# Patient Record
Sex: MALE | Race: ASIAN | URBAN - METROPOLITAN AREA
[De-identification: names, ages, dates, MRNs, and addresses within clinical notes are randomized per-mention and may not be internally consistent; named-entity substitution may affect disease eponyms.]

---

## 2018-09-27 ENCOUNTER — MEDICAL CORRESPONDENCE (OUTPATIENT)
Dept: HEALTH INFORMATION MANAGEMENT | Facility: CLINIC | Age: 78
End: 2018-09-27

## 2018-10-22 ENCOUNTER — RADIANT APPOINTMENT (OUTPATIENT)
Dept: CARDIOLOGY | Facility: CLINIC | Age: 78
End: 2018-10-22
Attending: INTERNAL MEDICINE
Payer: COMMERCIAL

## 2018-10-22 DIAGNOSIS — R01.1 HEART MURMUR: ICD-10-CM

## 2018-10-24 ENCOUNTER — OFFICE VISIT (OUTPATIENT)
Dept: OPHTHALMOLOGY | Facility: CLINIC | Age: 78
End: 2018-10-24
Attending: OPHTHALMOLOGY
Payer: COMMERCIAL

## 2018-10-24 DIAGNOSIS — H52.203 MYOPIC ASTIGMATISM OF BOTH EYES: ICD-10-CM

## 2018-10-24 DIAGNOSIS — H04.123 DRY EYES, BILATERAL: Primary | ICD-10-CM

## 2018-10-24 DIAGNOSIS — H01.009 BLEPHARITIS OF BOTH UPPER AND LOWER EYELID: ICD-10-CM

## 2018-10-24 DIAGNOSIS — Z96.1 PSEUDOPHAKIA OF BOTH EYES: ICD-10-CM

## 2018-10-24 DIAGNOSIS — H52.13 MYOPIC ASTIGMATISM OF BOTH EYES: ICD-10-CM

## 2018-10-24 DIAGNOSIS — H52.4 PRESBYOPIA: ICD-10-CM

## 2018-10-24 PROCEDURE — G0463 HOSPITAL OUTPT CLINIC VISIT: HCPCS | Mod: ZF

## 2018-10-24 PROCEDURE — 92015 DETERMINE REFRACTIVE STATE: CPT | Mod: GY,ZF

## 2018-10-24 ASSESSMENT — REFRACTION_WEARINGRX
OS_SPHERE: +1.25
OD_CYLINDER: SPHERE
OS_CYLINDER: SPHERE
OD_SPHERE: +1.25

## 2018-10-24 ASSESSMENT — TONOMETRY
OS_IOP_MMHG: 13
OD_IOP_MMHG: 09
IOP_METHOD: TONOPEN

## 2018-10-24 ASSESSMENT — REFRACTION_MANIFEST
OS_ADD: +2.50
OD_SPHERE: -1.00
OS_AXIS: 180
OS_CYLINDER: +1.00
OS_SPHERE: -1.25
OD_AXIS: 005
OD_CYLINDER: +1.50
OD_ADD: +2.50

## 2018-10-24 ASSESSMENT — VISUAL ACUITY
OD_SC+: -2
OD_SC: 20/40
METHOD: NUMBERS - LINEAR
OS_SC: 20/25

## 2018-10-24 ASSESSMENT — CUP TO DISC RATIO
OD_RATIO: 0.3
OS_RATIO: 0.3

## 2018-10-24 ASSESSMENT — SLIT LAMP EXAM - LIDS
COMMENTS: MGD, BLEPHARITIS
COMMENTS: MGD, BLEPHARITIS

## 2018-10-24 ASSESSMENT — CONF VISUAL FIELD
OS_NORMAL: 1
OD_NORMAL: 1

## 2018-10-24 NOTE — NURSING NOTE
Chief Complaints and History of Present Illnesses   Patient presents with     Annual Eye Exam     HPI    Affected eye(s):  Both   Symptoms:     No blurred vision   No decreased vision      Duration:  2 years      Do you have eye pain now?:  No      Comments:  Annual comprehensive eye exam.    SHARYN Willams 7:38 AM 10/24/2018

## 2018-10-24 NOTE — PROGRESS NOTES
HPI  Kaitlyn Castillo is a 78 year old male with pseudophakia each eye who presents for 2.5 year f/u eye exam. No complaints. States he is seeing clearly w/o eye pain, itching, or irritation. No gtts use. Reports no general health changes; no DM or HTN. Pt's PCP has prescribed a topical medication for presumed facial dermatitis.    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  Exam >>> symptoms.   Plan: AT PRN each eye         Patient disposition:   Return in about 1 year (around 10/24/2019) for dilated annual exam, prn for any vision/eye problems.        Emile Trejo MD  Ophthalmology Resident, PGY2

## 2018-10-24 NOTE — PROGRESS NOTES
KANDICE Castillo is a 78 year old male here for full eye exam. He feels his vision is doing well and is stable in both eyes since last visit 2 years ago. No pain, redness, discharge. No flashes/floaters. He is not using any eye drops.     POH: Cataract surgery both eyes  PMH: No known diabetes or HTN  FH: No known FH of eye problems  SH: Non-smoker    Assessment & Plan     (H04.123) Dry eyes, bilateral  (primary encounter diagnosis)  (H01.009) Blepharitis of both upper and lower eyelid  Comment: Asymptomatic, but PEE left eye > right eye with scurf  Plan: ATs at least BID, try warm compresses/lid hygiene    (Z96.1) Pseudophakia of both eyes  Comment: Clear visual axis  Plan: Observe    (H52.203) Myopic astigmatism of both eyes/(H52.4) Presbyopia  Comment: Good vision with refraction  Plan: Given updated glasses Rx (optional to fill)     -----------------------------------------------------------------------------------    Patient disposition:   RTC 1 year or sooner as needed    Teaching statement:  Complete documentation of historical and exam elements from today's encounter can be found in the full encounter summary report (not reduplicated in this progress note). I personally obtained the chief complaint(s) and history of present illness.  I confirmed and edited as necessary the review of systems, past medical/surgical history, family history, social history, and examination findings as documented by others; and I examined the patient myself. I personally reviewed the relevant tests, images, and reports as documented above.     I formulated and edited as necessary the assessment and plan and discussed the findings and management plan with the patient and family.    Kristal Sahu MD  Comprehensive Ophthalmology & Ocular Pathology  Department of Ophthalmology and Visual Neurosciences  [email or web address]  Pager [phone]

## 2018-10-24 NOTE — PATIENT INSTRUCTIONS
Future Appointments  Date Time Provider Department Center   10/28/2019 7:30 AM [facility]

## 2018-10-24 NOTE — MR AVS SNAPSHOT
After Visit Summary   10/24/2018    Kaitlyn Castillo    MRN: [id number]           Patient Information     Date Of Birth          1940        Visit Information        Provider Department      10/24/2018 7:15 AM Kristal Sahu MD; [facility]        Today's Diagnoses     Dry eyes, bilateral    -  1    Pseudophakia of both eyes        Blepharitis of both upper and lower eyelid        Myopic astigmatism of both eyes        Presbyopia          Care Instructions    Future Appointments  Date Time Provider Department Center   10/28/2019 7:30 AM Krisatl Sahu MD [facility]               Follow-ups after your visit        Follow-up notes from your care team     Return in about 1 year (around 10/24/2019) for dilated annual exam, prn for any vision/eye problems.      Who to contact     Please call your clinic at [phone] to:    Ask questions about your health    Make or cancel appointments    Discuss your medicines    Learn about your test results    Speak to your doctor            Additional Information About Your Visit        Care EveryWhere ID     This is your Care EveryWhere ID. This could be used by other organizations to access your [facility] medical records  [id number]         Blood Pressure from Last 3 Encounters:   06/25/16 112/68   05/27/16 109/74   01/22/16 (P) 106/76    Weight from Last 3 Encounters:   06/25/16 50.8 kg (112 lb)   05/26/16 51.6 kg (113 lb 11.2 oz)   01/22/16 53.3 kg (117 lb 6.4 oz)              Today, you had the following     No orders found for display         Today's Medication Changes          These changes are accurate as of 10/24/18  9:24 AM.  If you have any questions, ask your nurse or doctor.               Stop taking these medicines if you haven't already. Please contact your care team if you have questions.     AMLODIPINE BESYLATE PO   Stopped by:  Kristal Sahu MD           ATORVASTATIN CALCIUM PO   Stopped by:  Kristal Sahu MD                     Primary Care Provider Office Phone # Fax #    Felicity George [phone]       [facility] CARE [address] [facility] [address]        Equal Access to Services     EDU GOULD : Hadii aad ku hadjaredo Soomaali, waaxda luqadaha, qaybta kaalmada adeegyada, waxyue celayan tracee haynesling keita. So [facility] [phone].    ATENCIÓN: Si habla español, tiene a mckeon disposición servicios gratuitos de asistencia lingüística. [facility] [phone].    We comply with applicable federal civil rights laws and [place] laws. We do not discriminate on the basis of race, color, national origin, age, disability, sex, sexual orientation, or gender identity.            Thank you!     Thank you for choosing [facility]  for your care. Our goal is always to provide you with excellent care. Hearing back from our patients is one way we can continue to improve our services. Please take a few minutes to complete the written survey that you may receive in the mail after your visit with us. Thank you!             Your Updated Medication List - Protect others around you: Learn how to safely use, store and throw away your medicines at www.disposemymeds.org.          This list is accurate as of 10/24/18  9:24 AM.  Always use your most recent med list.                   Brand Name Dispense Instructions for use Diagnosis    albuterol 108 (90 Base) MCG/ACT inhaler    PROAIR HFA/PROVENTIL HFA/VENTOLIN HFA    1 Inhaler    Inhale 1-2 puffs into the lungs every 4 hours as needed for shortness of breath / dyspnea or wheezing    Acute bronchitis with coexisting condition requiring prophylactic treatment       levofloxacin 0.5 % ophthalmic solution    QUIXIN    2 Bottle    1 drop in surgical eye as directed - 4x daily for 1 week, then stop    Cataract, left       * prednisoLONE acetate 1 % ophthalmic susp    PRED FORTE    2 Bottle    1 drop in surgical eye as directed, 4x daily after surgery for 1  week, 3x daily for 1 week, 2x daily for 1 week, daily for 1 week, then stop    Senile nuclear sclerosis, bilateral       * prednisoLONE acetate 1 % ophthalmic susp    PRED FORTE    2 Bottle    1 drop in surgical eye as directed, 4x daily after surgery for 1 week, 3x daily for 1 week, 2x daily for 1 week, daily for 1 week, then stop    Cataract, left       TAMSULOSIN HCL PO      Take 0.4 mg by mouth daily        UNKNOWN TO PATIENT      For high blood pressure        * Notice:  This list has 2 medication(s) that are the same as other medications prescribed for you. Read the directions carefully, and ask your doctor or other care provider to review them with you.

## 2019-07-25 ENCOUNTER — TELEPHONE (OUTPATIENT)
Dept: OPHTHALMOLOGY | Facility: CLINIC | Age: 79
End: 2019-07-25